# Patient Record
Sex: FEMALE | URBAN - METROPOLITAN AREA
[De-identification: names, ages, dates, MRNs, and addresses within clinical notes are randomized per-mention and may not be internally consistent; named-entity substitution may affect disease eponyms.]

---

## 2023-01-03 ENCOUNTER — APPOINTMENT (RX ONLY)
Dept: URBAN - METROPOLITAN AREA CLINIC 151 | Facility: CLINIC | Age: 58
Setting detail: DERMATOLOGY
End: 2023-01-03

## 2023-01-03 DIAGNOSIS — L13.1 SUBCORNEAL PUSTULAR DERMATITIS: ICD-10-CM

## 2023-01-03 PROCEDURE — ? COUNSELING

## 2023-01-03 PROCEDURE — ? PRESCRIPTION

## 2023-01-03 PROCEDURE — 99203 OFFICE O/P NEW LOW 30 MIN: CPT

## 2023-01-03 PROCEDURE — ? PRESCRIPTION MEDICATION MANAGEMENT

## 2023-01-03 PROCEDURE — ? DIAGNOSIS COMMENT

## 2023-01-03 RX ORDER — DICAPRYLYL CARBONATE/DIMETH
SPRAY, NON-AEROSOL (ML) TOPICAL
Qty: 720 | Refills: 4 | Status: ERX | COMMUNITY
Start: 2023-01-03

## 2023-01-03 RX ORDER — CLOBETASOL PROPIONATE 0.5 MG/G
OINTMENT TOPICAL
Qty: 45 | Refills: 3 | Status: ERX | COMMUNITY
Start: 2023-01-03

## 2023-01-03 RX ADMIN — CLOBETASOL PROPIONATE: 0.5 OINTMENT TOPICAL at 00:00

## 2023-01-03 RX ADMIN — Medication: at 00:00

## 2023-01-03 ASSESSMENT — LOCATION DETAILED DESCRIPTION DERM
LOCATION DETAILED: LEFT RADIAL DORSAL HAND
LOCATION DETAILED: LEFT PROXIMAL DORSAL FOREARM
LOCATION DETAILED: RIGHT ANTERIOR LATERAL PROXIMAL UPPER ARM

## 2023-01-03 ASSESSMENT — LOCATION SIMPLE DESCRIPTION DERM
LOCATION SIMPLE: RIGHT UPPER ARM
LOCATION SIMPLE: LEFT HAND
LOCATION SIMPLE: LEFT FOREARM

## 2023-01-03 ASSESSMENT — LOCATION ZONE DERM
LOCATION ZONE: HAND
LOCATION ZONE: ARM

## 2023-01-03 NOTE — HPI: RASH
Is This A New Presentation, Or A Follow-Up?: Rash
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 She was treated then with topical steroids and antibiotics. In the years since then, she has had mild flares that were treated with topical steroids. She has currently been flaring for the last 2 months.

## 2023-01-03 NOTE — PROCEDURE: PRESCRIPTION MEDICATION MANAGEMENT
Initiate Treatment: Levicyn 0.009% spray QAM\\nClobetasol 0.05% ointment QHS
Render In Strict Bullet Format?: No
Detail Level: Zone

## 2024-06-24 ENCOUNTER — NEW PATIENT (OUTPATIENT)
Dept: URBAN - METROPOLITAN AREA CLINIC 45 | Facility: CLINIC | Age: 59
End: 2024-06-24

## 2024-06-24 DIAGNOSIS — H25.13: ICD-10-CM

## 2024-06-24 DIAGNOSIS — H04.123: ICD-10-CM

## 2024-06-24 DIAGNOSIS — H40.013: ICD-10-CM

## 2024-06-24 PROCEDURE — 92250 FUNDUS PHOTOGRAPHY W/I&R: CPT

## 2024-06-24 PROCEDURE — 76514 ECHO EXAM OF EYE THICKNESS: CPT

## 2024-06-24 PROCEDURE — 92004 COMPRE OPH EXAM NEW PT 1/>: CPT

## 2024-06-24 ASSESSMENT — KERATOMETRY
OS_K1POWER_DIOPTERS: 43.25
OD_K2POWER_DIOPTERS: 44
OS_K2POWER_DIOPTERS: 43.5
OD_AXISANGLE2_DEGREES: 130
OD_K1POWER_DIOPTERS: 42.5
OD_AXISANGLE_DEGREES: 40
OS_AXISANGLE2_DEGREES: 119
OS_AXISANGLE_DEGREES: 29

## 2024-06-24 ASSESSMENT — VISUAL ACUITY
OD_CC: 20/30
OS_CC: 20/40+1

## 2024-06-24 ASSESSMENT — PACHYMETRY
OD_CT_UM: 563
OS_CT_UM: 575

## 2024-07-03 ENCOUNTER — RX CHECK (OUTPATIENT)
Dept: URBAN - METROPOLITAN AREA CLINIC 45 | Facility: CLINIC | Age: 59
End: 2024-07-03

## 2024-07-03 ENCOUNTER — DIAGNOSTICS ONLY (OUTPATIENT)
Dept: URBAN - METROPOLITAN AREA CLINIC 45 | Facility: CLINIC | Age: 59
End: 2024-07-03

## 2024-07-03 DIAGNOSIS — H52.4: ICD-10-CM

## 2024-07-03 DIAGNOSIS — H40.013: ICD-10-CM

## 2024-07-03 DIAGNOSIS — H25.13: ICD-10-CM

## 2024-07-03 PROCEDURE — 92015 DETERMINE REFRACTIVE STATE: CPT

## 2024-07-03 PROCEDURE — 92133 CPTRZD OPH DX IMG PST SGM ON: CPT

## 2024-07-03 PROCEDURE — 92083 EXTENDED VISUAL FIELD XM: CPT

## 2024-07-03 ASSESSMENT — VISUAL ACUITY
OD_CC: 20/25
OS_CC: 20/30

## 2024-07-03 ASSESSMENT — KERATOMETRY
OD_K1POWER_DIOPTERS: 42.5
OS_K1POWER_DIOPTERS: 43.25
OD_AXISANGLE_DEGREES: 40
OD_AXISANGLE2_DEGREES: 130
OD_K2POWER_DIOPTERS: 44
OD_AXISANGLE_DEGREES: 40
OS_K2POWER_DIOPTERS: 43.5
OS_K2POWER_DIOPTERS: 43.5
OS_AXISANGLE_DEGREES: 29
OD_K1POWER_DIOPTERS: 42.5
OD_AXISANGLE2_DEGREES: 130
OS_AXISANGLE2_DEGREES: 119
OS_K1POWER_DIOPTERS: 43.25
OS_AXISANGLE_DEGREES: 29
OS_AXISANGLE2_DEGREES: 119
OD_K2POWER_DIOPTERS: 44